# Patient Record
Sex: MALE | Race: AMERICAN INDIAN OR ALASKA NATIVE
[De-identification: names, ages, dates, MRNs, and addresses within clinical notes are randomized per-mention and may not be internally consistent; named-entity substitution may affect disease eponyms.]

---

## 2018-05-31 ENCOUNTER — HOSPITAL ENCOUNTER (EMERGENCY)
Dept: HOSPITAL 5 - ED | Age: 78
LOS: 1 days | Discharge: HOME | End: 2018-06-01
Payer: MEDICARE

## 2018-05-31 DIAGNOSIS — F10.10: Primary | ICD-10-CM

## 2018-05-31 PROCEDURE — 71045 X-RAY EXAM CHEST 1 VIEW: CPT

## 2018-05-31 PROCEDURE — 51702 INSERT TEMP BLADDER CATH: CPT

## 2018-05-31 PROCEDURE — 80307 DRUG TEST PRSMV CHEM ANLYZR: CPT

## 2018-05-31 PROCEDURE — 81001 URINALYSIS AUTO W/SCOPE: CPT

## 2018-05-31 PROCEDURE — 99285 EMERGENCY DEPT VISIT HI MDM: CPT

## 2018-05-31 PROCEDURE — 96365 THER/PROPH/DIAG IV INF INIT: CPT

## 2018-05-31 PROCEDURE — 84443 ASSAY THYROID STIM HORMONE: CPT

## 2018-05-31 PROCEDURE — 85025 COMPLETE CBC W/AUTO DIFF WBC: CPT

## 2018-05-31 PROCEDURE — 80053 COMPREHEN METABOLIC PANEL: CPT

## 2018-05-31 PROCEDURE — 70450 CT HEAD/BRAIN W/O DYE: CPT

## 2018-05-31 PROCEDURE — 80320 DRUG SCREEN QUANTALCOHOLS: CPT

## 2018-05-31 PROCEDURE — 36415 COLL VENOUS BLD VENIPUNCTURE: CPT

## 2018-05-31 PROCEDURE — 84484 ASSAY OF TROPONIN QUANT: CPT

## 2018-05-31 PROCEDURE — G0480 DRUG TEST DEF 1-7 CLASSES: HCPCS

## 2018-05-31 PROCEDURE — 93010 ELECTROCARDIOGRAM REPORT: CPT

## 2018-05-31 PROCEDURE — 83880 ASSAY OF NATRIURETIC PEPTIDE: CPT

## 2018-05-31 PROCEDURE — 93005 ELECTROCARDIOGRAM TRACING: CPT

## 2018-05-31 PROCEDURE — 96366 THER/PROPH/DIAG IV INF ADDON: CPT

## 2018-06-01 VITALS — DIASTOLIC BLOOD PRESSURE: 75 MMHG | SYSTOLIC BLOOD PRESSURE: 152 MMHG

## 2018-06-01 LAB
ALBUMIN SERPL-MCNC: 3.1 G/DL (ref 3.9–5)
ALT SERPL-CCNC: 15 UNITS/L (ref 7–56)
BASOPHILS # (AUTO): 0 K/MM3 (ref 0–0.1)
BASOPHILS NFR BLD AUTO: 0.5 % (ref 0–1.8)
BENZODIAZEPINES SCREEN,URINE: (no result)
BILIRUB UR QL STRIP: (no result)
BLOOD UR QL VISUAL: (no result)
BUN SERPL-MCNC: 7 MG/DL (ref 9–20)
BUN/CREAT SERPL: 8 %
CALCIUM SERPL-MCNC: 7.9 MG/DL (ref 8.4–10.2)
EOSINOPHIL # BLD AUTO: 0.1 K/MM3 (ref 0–0.4)
EOSINOPHIL NFR BLD AUTO: 0.7 % (ref 0–4.3)
HCT VFR BLD CALC: 33.6 % (ref 35.5–45.6)
HEMOLYSIS INDEX: 3
HGB BLD-MCNC: 10.7 GM/DL (ref 11.8–15.2)
HGB S BLD QL SOLY: (no result)
HYALINE CASTS #/AREA URNS LPF: 14 /LPF
LYMPHOCYTES # BLD AUTO: 2.8 K/MM3 (ref 1.2–5.4)
LYMPHOCYTES NFR BLD AUTO: 36.2 % (ref 13.4–35)
MCH RBC QN AUTO: 32 PG (ref 28–32)
MCHC RBC AUTO-ENTMCNC: 32 % (ref 32–34)
MCV RBC AUTO: 101 FL (ref 84–94)
METHADONE SCREEN,URINE: (no result)
MONOCYTES # (AUTO): 0.5 K/MM3 (ref 0–0.8)
MONOCYTES % (AUTO): 5.8 % (ref 0–7.3)
MUCOUS THREADS #/AREA URNS HPF: (no result) /HPF
OPIATE SCREEN,URINE: (no result)
PH UR STRIP: 5 [PH] (ref 5–7)
PLATELET # BLD: 356 K/MM3 (ref 140–440)
PROT UR STRIP-MCNC: (no result) MG/DL
RBC # BLD AUTO: 3.34 M/MM3 (ref 3.65–5.03)
RBC #/AREA URNS HPF: < 1 /HPF (ref 0–6)
UROBILINOGEN UR-MCNC: < 2 MG/DL (ref ?–2)
WBC #/AREA URNS HPF: 1 /HPF (ref 0–6)

## 2018-06-01 RX ADMIN — FOLIC ACID SCH: 5 INJECTION, SOLUTION INTRAMUSCULAR; INTRAVENOUS; SUBCUTANEOUS at 18:56

## 2018-06-01 RX ADMIN — FOLIC ACID SCH MLS/HR: 5 INJECTION, SOLUTION INTRAMUSCULAR; INTRAVENOUS; SUBCUTANEOUS at 02:54

## 2018-06-01 RX ADMIN — FOLIC ACID SCH: 5 INJECTION, SOLUTION INTRAMUSCULAR; INTRAVENOUS; SUBCUTANEOUS at 16:22

## 2018-06-04 NOTE — CAT SCAN REPORT
FINAL REPORT



PROCEDURE:  CT HEAD/BRAIN WO CON



TECHNIQUE:  Computerized tomography of the head was performed

without contrast material. 



HISTORY:  AMS 



COMPARISON:  No prior studies are available for comparison.



FINDINGS:  

Skull and scalp: Normal.



Paranasal sinuses: Normal.



Ventricles and subarachnoid spaces: There is moderate central and

cortical atrophy. There is no hydrocephalus..



Cerebrum: No evidence of hemorrhage, acute infarction or mass.

There is chronic deep white matter ischemic gliosis. 



Cerebellum and brainstem: No evidence of hemorrhage, acute

infarction or mass.



Vasculature: Normal.



Comments: None.



IMPRESSION:  

There is no hemorrhage, edema, mass, mass effect or midline

shift.

## 2018-06-04 NOTE — XRAY REPORT
FINAL REPORT



PROCEDURE:  XR CHEST 1V AP



TECHNIQUE:  Chest radiograph anteroposterior view. CPT 99723







HISTORY:  SOB 



COMPARISON:  No prior studies are available for comparison.



FINDINGS:  

Heart: Normal.



Mediastinum/Vessels: Normal.



Lungs/Pleural space: There are no infiltrates, effusions or

pneumothoraces..



Bony thorax: No acute osseous abnormality.



Life support devices: None.



IMPRESSION:  

No acute cardiopulmonary abnormality.

## 2018-06-08 ENCOUNTER — HOSPITAL ENCOUNTER (EMERGENCY)
Dept: HOSPITAL 5 - ED | Age: 78
LOS: 1 days | Discharge: HOME | End: 2018-06-09
Payer: MEDICARE

## 2018-06-08 VITALS — SYSTOLIC BLOOD PRESSURE: 179 MMHG | DIASTOLIC BLOOD PRESSURE: 90 MMHG

## 2018-06-08 DIAGNOSIS — R10.30: ICD-10-CM

## 2018-06-08 DIAGNOSIS — Z86.73: ICD-10-CM

## 2018-06-08 DIAGNOSIS — I50.9: ICD-10-CM

## 2018-06-08 DIAGNOSIS — I10: ICD-10-CM

## 2018-06-08 DIAGNOSIS — R07.9: Primary | ICD-10-CM

## 2018-06-08 DIAGNOSIS — E11.9: ICD-10-CM

## 2018-06-08 LAB
ALBUMIN SERPL-MCNC: 3.3 G/DL (ref 3.9–5)
ALT SERPL-CCNC: 14 UNITS/L (ref 7–56)
BASOPHILS # (AUTO): 0.1 K/MM3 (ref 0–0.1)
BASOPHILS NFR BLD AUTO: 0.8 % (ref 0–1.8)
BILIRUB DIRECT SERPL-MCNC: < 0.2 MG/DL (ref 0–0.2)
BUN SERPL-MCNC: 7 MG/DL (ref 9–20)
BUN/CREAT SERPL: 8 %
CALCIUM SERPL-MCNC: 8.3 MG/DL (ref 8.4–10.2)
EOSINOPHIL # BLD AUTO: 0.1 K/MM3 (ref 0–0.4)
EOSINOPHIL NFR BLD AUTO: 1.4 % (ref 0–4.3)
HCT VFR BLD CALC: 34.7 % (ref 35.5–45.6)
HEMOLYSIS INDEX: 7
HGB BLD-MCNC: 11 GM/DL (ref 11.8–15.2)
LIPASE SERPL-CCNC: 12 UNITS/L (ref 13–60)
LYMPHOCYTES # BLD AUTO: 2.5 K/MM3 (ref 1.2–5.4)
LYMPHOCYTES NFR BLD AUTO: 32.8 % (ref 13.4–35)
MCH RBC QN AUTO: 32 PG (ref 28–32)
MCHC RBC AUTO-ENTMCNC: 32 % (ref 32–34)
MCV RBC AUTO: 101 FL (ref 84–94)
MONOCYTES # (AUTO): 0.7 K/MM3 (ref 0–0.8)
MONOCYTES % (AUTO): 8.4 % (ref 0–7.3)
PLATELET # BLD: 298 K/MM3 (ref 140–440)
RBC # BLD AUTO: 3.44 M/MM3 (ref 3.65–5.03)

## 2018-06-08 PROCEDURE — 93010 ELECTROCARDIOGRAM REPORT: CPT

## 2018-06-08 PROCEDURE — 74177 CT ABD & PELVIS W/CONTRAST: CPT

## 2018-06-08 PROCEDURE — 83690 ASSAY OF LIPASE: CPT

## 2018-06-08 PROCEDURE — 36415 COLL VENOUS BLD VENIPUNCTURE: CPT

## 2018-06-08 PROCEDURE — 84484 ASSAY OF TROPONIN QUANT: CPT

## 2018-06-08 PROCEDURE — 85025 COMPLETE CBC W/AUTO DIFF WBC: CPT

## 2018-06-08 PROCEDURE — 93005 ELECTROCARDIOGRAM TRACING: CPT

## 2018-06-08 PROCEDURE — 80074 ACUTE HEPATITIS PANEL: CPT

## 2018-06-08 PROCEDURE — 80048 BASIC METABOLIC PNL TOTAL CA: CPT

## 2018-06-08 PROCEDURE — 99285 EMERGENCY DEPT VISIT HI MDM: CPT

## 2018-06-08 PROCEDURE — 71275 CT ANGIOGRAPHY CHEST: CPT

## 2018-06-08 NOTE — EMERGENCY DEPARTMENT REPORT
HPI





- General


Chief Complaint: Chest Pain


Time Seen by Provider: 18 21:19





- HPI


HPI: 





Room 26





The patient is a 77-year-old male presenting with a chief complaint of diarrhea 

and chest pain.  The patient states for one week she has had frequent diarrhea 

with lower abdominal pain.  The patient states he uses food and drinks water 

and it feels as though food goes straight through him.  The patient states this 

morning he developed dull substernal chest pain that was intermittent.  The 

patient states his chest pain is associated with some shortness of breath, 

nausea and diaphoresis.  Patient denies vomiting.  Patient denies any recent 

antibiotic use or fever.  The patient states his chest pain has subsided.  The 

patient states he had a cardiac catheterization last year at [facility] the 

revealed clean coronary arteries





Location: Chest, gastrointestinal system


Duration: [See above]


Quality: Dull


Severity: Moderate


Modifying factors: [see above]


Context: [see above]


Mode of transportation: [not driving]





ED Past Medical Hx





- Past Medical History


Hx Hypertension: Yes


Hx CVA: Yes


Hx Congestive Heart Failure: Yes


Hx Diabetes: Yes





- Surgical History


Additional Surgical History: Left hand surgery





- Family History


Family history: no significant





- Social History


Smoking Status: Never Smoker


Substance Use Type: None (denies illicit drug use), Alcohol (2 beers daily)





- Medications


Home Medications: 


 Home Medications











 Medication  Instructions  Recorded  Confirmed  Last Taken  Type


 


Ciprofloxacin HCl [Ciprofloxacin 500 mg PO BID #14 tablet 18  Unknown Rx





TAB]     


 


Diphenoxylate HCl/Atropine 2 each PO QID PRN #20 tablet 18  Unknown Rx





[Lomotil 2.5-0.025 mg Tablet]     


 


HYDROcodone/APAP 5-325 [[place] 1 each PO Q6HR PRN #14 tablet 18  Unknown Rx





5/325]     


 


metroNIDAZOLE [Flagyl] 500 mg PO Q8HR #21 tablet 18  Unknown Rx














ED Review of Systems


ROS: 


Stated complaint: GENERAL PAIN


Other details as noted in HPI





Constitutional: diaphoresis


Eyes: denies: eye pain


ENT: denies: throat pain


Respiratory: shortness of breath


Cardiovascular: chest pain


Gastrointestinal: abdominal pain, nausea, diarrhea.  denies: vomiting


Genitourinary: denies: dysuria


Musculoskeletal: denies: back pain


Neurological: denies: headache





Physical Exam





- Physical Exam


Vital Signs: 


 Vital Signs











  18





  18:05


 


Temperature 98.6 F


 


Pulse Rate 104 H


 


Respiratory 22





Rate 


 


Blood Pressure 119/77


 


O2 Sat by Pulse 98





Oximetry 











Physical Exam: 





GENERAL: The patient is well-developed well-nourished sitting on stretcher not 

appearing to be in acute distress. []


HEENT: Normocephalic.  Atraumatic.  Extraocular motions are intact.  Patient 

has moist mucous membranes.


NECK: Supple.  Trachea midline


CHEST/LUNGS: Clear to auscultation.  There is no respiratory distress noted.


HEART/CARDIOVASCULAR: Regular.  There is no tachycardia.  There is no gallop 

rub or murmur.


ABDOMEN: Abdomen is soft, with mild discomfort to palpation in suprapubic and 

right lower quadrant.  Patient has normal bowel sounds.  There is no abdominal 

distention.


SKIN: There is no rash.  There is no edema.  There is no diaphoresis.


NEURO: The patient is awake, alert, and oriented.  The patient is cooperative. 

The patient has normal speech 


MUSCULOSKELETAL:  There is no evidence of acute injury.





ED Course


 Vital Signs











  18





  18:05


 


Temperature 98.6 F


 


Pulse Rate 104 H


 


Respiratory 22





Rate 


 


Blood Pressure 119/77


 


O2 Sat by Pulse 98





Oximetry 














ED Medical Decision Making





- Lab Data


Result diagrams: 


 18 18:34





 18 18:34





- EKG Data


-: EKG Interpreted by Me


EKG shows normal: sinus rhythm


Rate: normal





- EKG Data


When compared to previous EKG there are: no significant change


Interpretation: unchanged when compared t (2018)





- Radiology Data


Radiology results: report reviewed (CT chest, CT abdomen and pelvis), image 

reviewed (CT chest, CT abdomen and pelvis)





[address] [address] 





Cat Scan Report 


Signed 





Patient: GILBERT COBB MR#: [id number] 


: 1940 Acct:[id number] 


Age/Sex: 77 / M ADM Date: 18 


Loc: ED 


Attending Dr: 








Ordering Physician: MACIEJ NUNEZ MD 


Date of Service: 18 


Procedure(s): CT angio chest 


Accession Number(s): [id number] 





cc: MACIEJ NUNEZ MD 








FINAL REPORT 





EXAM: CT ANGIO CHEST 





HISTORY: chest pain 





TECHNIQUE: A CT angiogram was performed following the 


intravenous injection of iodinated contrast. Sagittal and coronal 


MIP reconstructions were reviewed. 





FINDINGS: 


There is no evidence of pulmonary embolus or aortic dissection. 


The thoracic aorta is normal in caliber. The heart size is 


normal. There are coronary artery calcifications. Pericardial 


fluid is not seen. There is a large hiatal hernia. The lungs 


otherwise reveal patchy reticular nodular densities particularly 


in both upper lobes right side worse than left side. There 


additional areas in both lower lobes as well. Whether these are 


on an acute or chronic basis is uncertain. Pleural fluid is not 


seen. The lungs are not congested. The skeletal structures reveal 


multilevel disc degeneration in the dorsal spine with prominent 


spurring suggesting the possibility of ankylosing spondylitis. 


There is no evidence of fracture. 





IMPRESSION: 


No evidence of pulmonary embolus or aortic dissection. 





Nonspecific patchy reticulonodular densities in both lungs as 


described. As to whether these findings are on an acute or 


chronic basis is uncertain. 





Large hiatal hernia. 





Changes in the dorsal spine suggesting the possibility of 


ankylosing spondylitis. 











Transcribed By: RB 


Dictated By: DICK OTTO MD 


Electronically Authenticated By: DICK OTTO MD 


Signed Date/Time: 18 











DD/DT: 18 


TD/TT: 18





[facility] 


[address] [place] [address] 





Cat Scan Report 


Signed 





Patient: GILBERT COBB MR#: [id number] 


: 1940 Acct:[id number] 


Age/Sex: 77 / M ADM Date: 18 


Loc: ED 


Attending Dr: 








Ordering Physician: MACIEJ NUNEZ MD 


Date of Service: 18 


Procedure(s): CT abdomen pelvis w con 


Accession Number(s): [id number] 





cc: MACIEJ NUNEZ MD 








FINAL REPORT 





EXAM: CT ABDOMEN PELVIS W CON 





HISTORY: lower abdominal pain, diarrhea 





TECHNIQUE: Routine axial imaging was obtained of the abdomen and 


pelvis following the intravenous injection of iodinated contrast. 


Sagittal and coronal reconstructions were reviewed. 





FINDINGS: 


The lung bases reveal patchy interstitial reticulonodular disease 


in both lower lobes. Pleural fluid is not identified. There is a 


large hiatal hernia. 





The liver is normal size. There is a 17.9 mm low-density focus 


along the dome of the liver. This may represent a small 


hemangioma. There is benign calcification in the right hepatic 


lobe also. The gallbladder is normal size reveals multiple 


dependent calcified stones. There are no secondary signs of acute 


cholecystitis. The pancreas, spleen, and adrenal glands appear 


normal. The kidneys enhance normally. There is no evidence of 


hydronephrosis. The abdominal aorta is normal in caliber. The 


bowel loops are normal in caliber. The appendix is not enlarged. 


There is no evidence of free fluid or adenopathy. In the pelvis 


prostate gland and bladder appear normal. The skeletal structures 


reveal multilevel disc degeneration in the lumbar spine with 


endplate spurring. There also partial ankylosis of the SI joints. 





IMPRESSION: 


No acute process in the abdomen and pelvis. 





17.9 mm low-density focus along the dome of the liver. This may 


represent a hemangioma. Ultrasonography might be helpful for 


further evaluation. 





Gallstones. No secondary signs of acute cholecystitis. 





Multilevel disc degeneration in the lumbar spine with endplate 


spurring. Partial ankylosis of the SI joints noted also appear 











Transcribed By: RB 


Dictated By: DICK OTTO MD 


Electronically Authenticated By: DICK OTTO MD 


Signed Date/Time: 18 











DD/DT: 18 


TD/TT: 18





- Medical Decision Making





Records from [facility] obtained.  They reveal the patient had a cardiac 

catheterization performed 2017 which revealed "conclusions: Normal 

coronary arteries"





- Differential Diagnosis


ACS, PE, GERD, pericarditis, enteritis, partial bowel obstruction


Critical care attestation.: 


If time is entered above; I have spent that time in minutes in the direct care 

of this critically ill patient, excluding procedure time.








ED Disposition


Clinical Impression: 


 Diarrhea, Abdominal pain, Chest pain





Disposition: DC-01 TO HOME OR SELFCARE


Is pt being admited?: No


Does the pt Need Aspirin: No


Condition: Stable


Instructions:  Chest Pain (ED)


Additional Instructions: 


Return to the emergency department immediately should you develop worsening 

symptoms, fever, inability to tolerate food or liquid or any other concerns.


Prescriptions: 


Ciprofloxacin HCl [Ciprofloxacin TAB] 500 mg PO BID #14 tablet


Diphenoxylate HCl/Atropine [Lomotil 2.5-0.025 mg Tablet] 2 each PO QID PRN #20 

tablet


 PRN Reason: Diarrhea


HYDROcodone/APAP 5-325 [[place] 5/325] 1 each PO Q6HR PRN #14 tablet


 PRN Reason: Pain


metroNIDAZOLE [Flagyl] 500 mg PO Q8HR #21 tablet


Referrals: 


ARON MANNING MD [Staff Physician] - 3-5 Days (Dr. Manning is a 

gastroenterologist.  Please follow-up with him for further evaluation of the 

"17.9 mm low density focus along the dome of the liver" seen on your CT scan of 

the abdomen/pelvis today as well as your abdominal pain and diarrhea)


Time of Disposition: 00:46

## 2018-06-09 NOTE — CAT SCAN REPORT
FINAL REPORT



EXAM:  CT ANGIO CHEST



HISTORY:  chest pain 



TECHNIQUE:  A CT angiogram was performed following the

intravenous injection of iodinated contrast. Sagittal and coronal

MIP reconstructions were reviewed.



FINDINGS:  

There is no evidence of pulmonary embolus or aortic dissection.

The thoracic aorta is normal in caliber. The heart size is

normal. There are coronary artery calcifications. Pericardial

fluid is not seen. There is a large hiatal hernia. The lungs

otherwise reveal patchy reticular nodular densities particularly

in both upper lobes right side worse than left side. There

additional areas in both lower lobes as well. Whether these are

on an acute or chronic basis is uncertain. Pleural fluid is not

seen. The lungs are not congested. The skeletal structures reveal

multilevel disc degeneration in the dorsal spine with prominent

spurring suggesting the possibility of ankylosing spondylitis.

There is no evidence of fracture. 



IMPRESSION:  

No evidence of pulmonary embolus or aortic dissection.



Nonspecific patchy reticulonodular densities in both lungs as

described. As to whether these findings are on an acute or

chronic basis is uncertain.



Large hiatal hernia.



Changes in the dorsal spine suggesting the possibility of

ankylosing spondylitis.

## 2018-06-09 NOTE — CAT SCAN REPORT
FINAL REPORT



EXAM:  CT ABDOMEN PELVIS W CON



HISTORY:  lower abdominal pain, diarrhea 



TECHNIQUE:  Routine axial imaging was obtained of the abdomen and

pelvis following the intravenous injection of iodinated contrast.

Sagittal and coronal reconstructions were reviewed.



FINDINGS:  

The lung bases reveal patchy interstitial reticulonodular disease

in both lower lobes. Pleural fluid is not identified. There is a

large hiatal hernia.



The liver is normal size. There is a 17.9 mm low-density focus

along the dome of the liver. This may represent a small

hemangioma. There is benign calcification in the right hepatic

lobe also. The gallbladder is normal size reveals multiple

dependent calcified stones. There are no secondary signs of acute

cholecystitis. The pancreas, spleen, and adrenal glands appear

normal. The kidneys enhance normally. There is no evidence of

hydronephrosis. The abdominal aorta is normal in caliber. The

bowel loops are normal in caliber. The appendix is not enlarged.

There is no evidence of free fluid or adenopathy. In the pelvis

prostate gland and bladder appear normal. The skeletal structures

reveal multilevel disc degeneration in the lumbar spine with

endplate spurring. There also partial ankylosis of the SI joints.



IMPRESSION:  

No acute process in the abdomen and pelvis.



17.9 mm low-density focus along the dome of the liver. This may

represent a hemangioma. Ultrasonography might be helpful for

further evaluation.



Gallstones. No secondary signs of acute cholecystitis.



Multilevel disc degeneration in the lumbar spine with endplate

spurring. Partial ankylosis of the SI joints noted also appear